# Patient Record
Sex: MALE | Race: OTHER | URBAN - METROPOLITAN AREA
[De-identification: names, ages, dates, MRNs, and addresses within clinical notes are randomized per-mention and may not be internally consistent; named-entity substitution may affect disease eponyms.]

---

## 2022-10-27 ENCOUNTER — OFFICE VISIT (OUTPATIENT)
Dept: FAMILY MEDICINE CLINIC | Facility: CLINIC | Age: 37
End: 2022-10-27
Payer: COMMERCIAL

## 2022-10-27 VITALS
WEIGHT: 204 LBS | TEMPERATURE: 98 F | OXYGEN SATURATION: 96 % | DIASTOLIC BLOOD PRESSURE: 78 MMHG | HEIGHT: 73.66 IN | BODY MASS INDEX: 26.46 KG/M2 | HEART RATE: 85 BPM | SYSTOLIC BLOOD PRESSURE: 124 MMHG | RESPIRATION RATE: 18 BRPM

## 2022-10-27 DIAGNOSIS — Z13.21 SCREENING FOR ENDOCRINE, NUTRITIONAL, METABOLIC AND IMMUNITY DISORDER: ICD-10-CM

## 2022-10-27 DIAGNOSIS — Z80.42 FAMILY HISTORY OF PROSTATE CANCER IN FATHER: ICD-10-CM

## 2022-10-27 DIAGNOSIS — Z00.00 ANNUAL PHYSICAL EXAM: Primary | ICD-10-CM

## 2022-10-27 DIAGNOSIS — Z13.0 SCREENING FOR ENDOCRINE, NUTRITIONAL, METABOLIC AND IMMUNITY DISORDER: ICD-10-CM

## 2022-10-27 DIAGNOSIS — Z13.228 SCREENING FOR ENDOCRINE, NUTRITIONAL, METABOLIC AND IMMUNITY DISORDER: ICD-10-CM

## 2022-10-27 DIAGNOSIS — Z13.29 SCREENING FOR ENDOCRINE, NUTRITIONAL, METABOLIC AND IMMUNITY DISORDER: ICD-10-CM

## 2022-10-27 DIAGNOSIS — Z13.6 SCREENING FOR ISCHEMIC HEART DISEASE: ICD-10-CM

## 2022-10-27 LAB
ALBUMIN SERPL-MCNC: 4.3 G/DL (ref 3.4–5)
ALBUMIN/GLOB SERPL: 1.3 {RATIO} (ref 1–2)
ALP LIVER SERPL-CCNC: 50 U/L
ALT SERPL-CCNC: 33 U/L
ANION GAP SERPL CALC-SCNC: 6 MMOL/L (ref 0–18)
AST SERPL-CCNC: 23 U/L (ref 15–37)
BASOPHILS # BLD AUTO: 0.05 X10(3) UL (ref 0–0.2)
BASOPHILS NFR BLD AUTO: 0.9 %
BILIRUB SERPL-MCNC: 0.8 MG/DL (ref 0.1–2)
BUN BLD-MCNC: 12 MG/DL (ref 7–18)
CALCIUM BLD-MCNC: 9.3 MG/DL (ref 8.5–10.1)
CHLORIDE SERPL-SCNC: 106 MMOL/L (ref 98–112)
CHOLEST SERPL-MCNC: 175 MG/DL (ref ?–200)
CO2 SERPL-SCNC: 28 MMOL/L (ref 21–32)
COMPLEXED PSA SERPL-MCNC: 0.36 NG/ML (ref ?–4)
CREAT BLD-MCNC: 1.14 MG/DL
EOSINOPHIL # BLD AUTO: 0.2 X10(3) UL (ref 0–0.7)
EOSINOPHIL NFR BLD AUTO: 3.6 %
ERYTHROCYTE [DISTWIDTH] IN BLOOD BY AUTOMATED COUNT: 12 %
FASTING PATIENT LIPID ANSWER: NO
FASTING STATUS PATIENT QL REPORTED: NO
GFR SERPLBLD BASED ON 1.73 SQ M-ARVRAT: 86 ML/MIN/1.73M2 (ref 60–?)
GLOBULIN PLAS-MCNC: 3.3 G/DL (ref 2.8–4.4)
GLUCOSE BLD-MCNC: 88 MG/DL (ref 70–99)
HCT VFR BLD AUTO: 45.7 %
HDLC SERPL-MCNC: 43 MG/DL (ref 40–59)
HGB BLD-MCNC: 15.9 G/DL
IMM GRANULOCYTES # BLD AUTO: 0.01 X10(3) UL (ref 0–1)
IMM GRANULOCYTES NFR BLD: 0.2 %
LDLC SERPL CALC-MCNC: 107 MG/DL (ref ?–100)
LYMPHOCYTES # BLD AUTO: 1.76 X10(3) UL (ref 1–4)
LYMPHOCYTES NFR BLD AUTO: 31.3 %
MCH RBC QN AUTO: 31.7 PG (ref 26–34)
MCHC RBC AUTO-ENTMCNC: 34.8 G/DL (ref 31–37)
MCV RBC AUTO: 91.2 FL
MONOCYTES # BLD AUTO: 0.52 X10(3) UL (ref 0.1–1)
MONOCYTES NFR BLD AUTO: 9.3 %
NEUTROPHILS # BLD AUTO: 3.08 X10 (3) UL (ref 1.5–7.7)
NEUTROPHILS # BLD AUTO: 3.08 X10(3) UL (ref 1.5–7.7)
NEUTROPHILS NFR BLD AUTO: 54.7 %
NONHDLC SERPL-MCNC: 132 MG/DL (ref ?–130)
OSMOLALITY SERPL CALC.SUM OF ELEC: 289 MOSM/KG (ref 275–295)
PLATELET # BLD AUTO: 262 10(3)UL (ref 150–450)
POTASSIUM SERPL-SCNC: 4.6 MMOL/L (ref 3.5–5.1)
PROT SERPL-MCNC: 7.6 G/DL (ref 6.4–8.2)
RBC # BLD AUTO: 5.01 X10(6)UL
SODIUM SERPL-SCNC: 140 MMOL/L (ref 136–145)
TRIGL SERPL-MCNC: 141 MG/DL (ref 30–149)
TSI SER-ACNC: 2.41 MIU/ML (ref 0.36–3.74)
VLDLC SERPL CALC-MCNC: 24 MG/DL (ref 0–30)
WBC # BLD AUTO: 5.6 X10(3) UL (ref 4–11)

## 2022-10-27 PROCEDURE — 3074F SYST BP LT 130 MM HG: CPT | Performed by: FAMILY MEDICINE

## 2022-10-27 PROCEDURE — 80061 LIPID PANEL: CPT | Performed by: FAMILY MEDICINE

## 2022-10-27 PROCEDURE — 36415 COLL VENOUS BLD VENIPUNCTURE: CPT | Performed by: FAMILY MEDICINE

## 2022-10-27 PROCEDURE — 3078F DIAST BP <80 MM HG: CPT | Performed by: FAMILY MEDICINE

## 2022-10-27 PROCEDURE — 84443 ASSAY THYROID STIM HORMONE: CPT | Performed by: FAMILY MEDICINE

## 2022-10-27 PROCEDURE — 99385 PREV VISIT NEW AGE 18-39: CPT | Performed by: FAMILY MEDICINE

## 2022-10-27 PROCEDURE — 3008F BODY MASS INDEX DOCD: CPT | Performed by: FAMILY MEDICINE

## 2022-10-27 PROCEDURE — 85025 COMPLETE CBC W/AUTO DIFF WBC: CPT | Performed by: FAMILY MEDICINE

## 2022-10-27 PROCEDURE — 80053 COMPREHEN METABOLIC PANEL: CPT | Performed by: FAMILY MEDICINE

## 2022-10-27 NOTE — PATIENT INSTRUCTIONS
Perform labs fasting 8 hours with water or black coffee or or black tea diet  soda only prior to exam.    -Encourage healthy diet of whole food and avoid processed food and sugary drinks and sodas. Diet should include lean meats and vegetables including 5-7 servings of fruit and vegetables total in 1 day. Never skip breakfast.  -Encouraged exercise 30 minutes or more 5 times weekly to prevent obesity and chronic disease and eliminate stress and its effect on the body. Total 150mins weekly or more. -encouraged to continue not smoking  - recommend condom use per CDC recommendation for all  or unmarried couples  -  immunizations- annual influenza vaccine recommended  -Vitamin D3 1000- 2000 units daily recommended  -Needs 1000mg of calcium daily for osteoporosis prevention discussed. Need to ingest 1000mg of calcium daily to prevent osteoporosis later in life. I.e. one 8 ounce glass of silk Mentone milk has 450 mg of calcium and label states 45%. Labels list calcium percentages not milligrams. To calculate milligrams per serving remove the percentage and add a zero (0).  I.e.  9% calcium equals 90 mg

## 2022-10-27 NOTE — PROGRESS NOTES
Patient came in for draw of ordered fasting labs. Patient drawn out of right arm AC, x 1 attempt and tolerated well.  1 SST (Green ) 1 Lavender  tube drawn.

## 2022-10-31 NOTE — PROGRESS NOTES
Becky notified:  See my chart message to patient. Dear Jia Walters,  Your labs are normal except your lipid panel is elevated. Treatment for mild to moderate abnormal cholesterol and lipids is best managed  initially with lifestyle changes, improving diet and increasing physical activity. Recommendations for exercise are 3-5 times weekly for 30-60 minutes for a minimum of 150-300 minutes. This will improve your cholesterol levels and strengthen your heart. If you are overweight, then losing weight may also improve your lipid profile. The Mediterranean diet is recommended and contains foods high in omega-3's and alpha linoleic acid; this helps improve your good cholesterol and may lower bad cholesterol. Eat salmon 2x weekly, consume moderate vegetables,lean meats/fish, nuts and healthy fats i.e. almonds, walnuts, flaxseed, hempseed, avocados, avocado or olive oil, spinach, green beans, organic strawberries, etc. Please avoid fried food or limit to 1 x monthly along with pizza and other foods that are high in saturated animal fats. Monitoring your cholesterol and lipid profile is recommended annually sooner as as directed by your doctor. Please call our office  if you have any further question or schedule an appointment.     Sincerely,  Tamar Reyes

## 2022-11-01 ENCOUNTER — TELEPHONE (OUTPATIENT)
Dept: FAMILY MEDICINE CLINIC | Facility: CLINIC | Age: 37
End: 2022-11-01

## 2022-11-01 NOTE — TELEPHONE ENCOUNTER
----- Message from Steve Terrazas DO sent at 10/31/2022 12:12 AM CDT -----  Becky notified:  See my chart message to patient. Dear Sandrita Groves,  Your labs are normal except your lipid panel is elevated. Treatment for mild to moderate abnormal cholesterol and lipids is best managed  initially with lifestyle changes, improving diet and increasing physical activity. Recommendations for exercise are 3-5 times weekly for 30-60 minutes for a minimum of 150-300 minutes. This will improve your cholesterol levels and strengthen your heart. If you are overweight, then losing weight may also improve your lipid profile. The Mediterranean diet is recommended and contains foods high in omega-3's and alpha linoleic acid; this helps improve your good cholesterol and may lower bad cholesterol. Eat salmon 2x weekly, consume moderate vegetables,lean meats/fish, nuts and healthy fats i.e. almonds, walnuts, flaxseed, hempseed, avocados, avocado or olive oil, spinach, green beans, organic strawberries, etc. Please avoid fried food or limit to 1 x monthly along with pizza and other foods that are high in saturated animal fats. Monitoring your cholesterol and lipid profile is recommended annually sooner as as directed by your doctor. Please call our office  if you have any further question or schedule an appointment.     Sincerely,  Izabella Barbour

## 2022-11-01 NOTE — TELEPHONE ENCOUNTER
Left detailed message informing patient test results and recommendations are available to him via [employer]. Advised patient to review those as soon as possible and contact our office with questions.

## 2023-01-13 ENCOUNTER — PATIENT MESSAGE (OUTPATIENT)
Dept: FAMILY MEDICINE CLINIC | Facility: CLINIC | Age: 38
End: 2023-01-13

## 2023-01-13 NOTE — TELEPHONE ENCOUNTER
From: Wang Heck Board  To: Fifi Smith DO  Sent: 1/13/2023 9:40 AM CST  Subject: TDAP vs. TD vaccines? Good morning,    I am curious if I should be getting a TDAP vaccine in anticipation of my daughter being born (due in March). I received a tetanus shot in summer 2020, but I don't know if it was the full TDAP or if it was just TD. I don't feel that I need an additional shot, if I'm already covered, but I want to make sure I've had what I need. Please advise. Thanks!

## 2023-11-03 ENCOUNTER — OFFICE VISIT (OUTPATIENT)
Dept: FAMILY MEDICINE CLINIC | Facility: CLINIC | Age: 38
End: 2023-11-03
Payer: COMMERCIAL

## 2023-11-03 VITALS
OXYGEN SATURATION: 99 % | HEART RATE: 75 BPM | BODY MASS INDEX: 27 KG/M2 | DIASTOLIC BLOOD PRESSURE: 60 MMHG | TEMPERATURE: 98 F | SYSTOLIC BLOOD PRESSURE: 104 MMHG | WEIGHT: 211.38 LBS | RESPIRATION RATE: 16 BRPM

## 2023-11-03 DIAGNOSIS — Z13.228 SCREENING FOR ENDOCRINE, NUTRITIONAL, METABOLIC AND IMMUNITY DISORDER: ICD-10-CM

## 2023-11-03 DIAGNOSIS — Z13.0 SCREENING FOR ENDOCRINE, NUTRITIONAL, METABOLIC AND IMMUNITY DISORDER: ICD-10-CM

## 2023-11-03 DIAGNOSIS — Z13.29 SCREENING FOR ENDOCRINE, NUTRITIONAL, METABOLIC AND IMMUNITY DISORDER: ICD-10-CM

## 2023-11-03 DIAGNOSIS — Z13.0 SCREENING FOR IRON DEFICIENCY ANEMIA: ICD-10-CM

## 2023-11-03 DIAGNOSIS — Z13.21 SCREENING FOR ENDOCRINE, NUTRITIONAL, METABOLIC AND IMMUNITY DISORDER: ICD-10-CM

## 2023-11-03 DIAGNOSIS — Z80.42 FAMILY HISTORY OF PROSTATE CANCER IN FATHER: ICD-10-CM

## 2023-11-03 DIAGNOSIS — Z13.6 SCREENING FOR HEART DISEASE: ICD-10-CM

## 2023-11-03 DIAGNOSIS — Z00.00 ANNUAL PHYSICAL EXAM: Primary | ICD-10-CM

## 2023-11-03 PROCEDURE — 99395 PREV VISIT EST AGE 18-39: CPT | Performed by: FAMILY MEDICINE

## 2023-11-03 PROCEDURE — 3074F SYST BP LT 130 MM HG: CPT | Performed by: FAMILY MEDICINE

## 2023-11-03 PROCEDURE — 3078F DIAST BP <80 MM HG: CPT | Performed by: FAMILY MEDICINE

## 2023-11-29 ENCOUNTER — NURSE ONLY (OUTPATIENT)
Dept: FAMILY MEDICINE CLINIC | Facility: CLINIC | Age: 38
End: 2023-11-29
Payer: COMMERCIAL

## 2023-11-29 DIAGNOSIS — Z13.21 SCREENING FOR ENDOCRINE, NUTRITIONAL, METABOLIC AND IMMUNITY DISORDER: ICD-10-CM

## 2023-11-29 DIAGNOSIS — R73.01 ELEVATED FASTING GLUCOSE: ICD-10-CM

## 2023-11-29 DIAGNOSIS — Z13.0 SCREENING FOR IRON DEFICIENCY ANEMIA: ICD-10-CM

## 2023-11-29 DIAGNOSIS — Z13.6 SCREENING FOR HEART DISEASE: ICD-10-CM

## 2023-11-29 DIAGNOSIS — Z00.00 ANNUAL PHYSICAL EXAM: ICD-10-CM

## 2023-11-29 DIAGNOSIS — Z13.0 SCREENING FOR ENDOCRINE, NUTRITIONAL, METABOLIC AND IMMUNITY DISORDER: ICD-10-CM

## 2023-11-29 DIAGNOSIS — Z13.228 SCREENING FOR ENDOCRINE, NUTRITIONAL, METABOLIC AND IMMUNITY DISORDER: ICD-10-CM

## 2023-11-29 DIAGNOSIS — Z13.29 SCREENING FOR ENDOCRINE, NUTRITIONAL, METABOLIC AND IMMUNITY DISORDER: ICD-10-CM

## 2023-11-29 DIAGNOSIS — R73.01 ELEVATED FASTING GLUCOSE: Primary | ICD-10-CM

## 2023-11-29 DIAGNOSIS — Z80.42 FAMILY HISTORY OF PROSTATE CANCER IN FATHER: ICD-10-CM

## 2023-11-29 LAB
ALBUMIN SERPL-MCNC: 4.3 G/DL (ref 3.4–5)
ALBUMIN/GLOB SERPL: 1.3 {RATIO} (ref 1–2)
ALP LIVER SERPL-CCNC: 51 U/L
ALT SERPL-CCNC: 40 U/L
ANION GAP SERPL CALC-SCNC: 5 MMOL/L (ref 0–18)
AST SERPL-CCNC: 23 U/L (ref 15–37)
BASOPHILS # BLD AUTO: 0.05 X10(3) UL (ref 0–0.2)
BASOPHILS NFR BLD AUTO: 1 %
BILIRUB SERPL-MCNC: 1.2 MG/DL (ref 0.1–2)
BUN BLD-MCNC: 14 MG/DL (ref 9–23)
CALCIUM BLD-MCNC: 9.5 MG/DL (ref 8.5–10.1)
CHLORIDE SERPL-SCNC: 106 MMOL/L (ref 98–112)
CHOLEST SERPL-MCNC: 203 MG/DL (ref ?–200)
CO2 SERPL-SCNC: 30 MMOL/L (ref 21–32)
COMPLEXED PSA SERPL-MCNC: 0.34 NG/ML (ref ?–4)
CREAT BLD-MCNC: 1.14 MG/DL
EGFRCR SERPLBLD CKD-EPI 2021: 85 ML/MIN/1.73M2 (ref 60–?)
EOSINOPHIL # BLD AUTO: 0.18 X10(3) UL (ref 0–0.7)
EOSINOPHIL NFR BLD AUTO: 3.5 %
ERYTHROCYTE [DISTWIDTH] IN BLOOD BY AUTOMATED COUNT: 11.9 %
EST. AVERAGE GLUCOSE BLD GHB EST-MCNC: 108 MG/DL (ref 68–126)
FASTING PATIENT LIPID ANSWER: YES
FASTING STATUS PATIENT QL REPORTED: YES
GLOBULIN PLAS-MCNC: 3.3 G/DL (ref 2.8–4.4)
GLUCOSE BLD-MCNC: 102 MG/DL (ref 70–99)
HBA1C MFR BLD: 5.4 % (ref ?–5.7)
HCT VFR BLD AUTO: 44.3 %
HDLC SERPL-MCNC: 42 MG/DL (ref 40–59)
HGB BLD-MCNC: 15.4 G/DL
IMM GRANULOCYTES # BLD AUTO: 0.01 X10(3) UL (ref 0–1)
IMM GRANULOCYTES NFR BLD: 0.2 %
LDLC SERPL CALC-MCNC: 131 MG/DL (ref ?–100)
LYMPHOCYTES # BLD AUTO: 1.83 X10(3) UL (ref 1–4)
LYMPHOCYTES NFR BLD AUTO: 35.5 %
MCH RBC QN AUTO: 31.4 PG (ref 26–34)
MCHC RBC AUTO-ENTMCNC: 34.8 G/DL (ref 31–37)
MCV RBC AUTO: 90.2 FL
MONOCYTES # BLD AUTO: 0.46 X10(3) UL (ref 0.1–1)
MONOCYTES NFR BLD AUTO: 8.9 %
NEUTROPHILS # BLD AUTO: 2.63 X10 (3) UL (ref 1.5–7.7)
NEUTROPHILS # BLD AUTO: 2.63 X10(3) UL (ref 1.5–7.7)
NEUTROPHILS NFR BLD AUTO: 50.9 %
NONHDLC SERPL-MCNC: 161 MG/DL (ref ?–130)
OSMOLALITY SERPL CALC.SUM OF ELEC: 293 MOSM/KG (ref 275–295)
PLATELET # BLD AUTO: 256 10(3)UL (ref 150–450)
POTASSIUM SERPL-SCNC: 4.3 MMOL/L (ref 3.5–5.1)
PROT SERPL-MCNC: 7.6 G/DL (ref 6.4–8.2)
RBC # BLD AUTO: 4.91 X10(6)UL
SODIUM SERPL-SCNC: 141 MMOL/L (ref 136–145)
TRIGL SERPL-MCNC: 170 MG/DL (ref 30–149)
TSI SER-ACNC: 1.83 MIU/ML (ref 0.36–3.74)
VLDLC SERPL CALC-MCNC: 31 MG/DL (ref 0–30)
WBC # BLD AUTO: 5.2 X10(3) UL (ref 4–11)

## 2023-11-29 PROCEDURE — 83036 HEMOGLOBIN GLYCOSYLATED A1C: CPT | Performed by: FAMILY MEDICINE

## 2023-11-29 PROCEDURE — 84443 ASSAY THYROID STIM HORMONE: CPT | Performed by: FAMILY MEDICINE

## 2023-11-29 PROCEDURE — 36415 COLL VENOUS BLD VENIPUNCTURE: CPT | Performed by: FAMILY MEDICINE

## 2023-11-29 PROCEDURE — 80053 COMPREHEN METABOLIC PANEL: CPT | Performed by: FAMILY MEDICINE

## 2023-11-29 PROCEDURE — 85025 COMPLETE CBC W/AUTO DIFF WBC: CPT | Performed by: FAMILY MEDICINE

## 2023-11-29 PROCEDURE — 80061 LIPID PANEL: CPT | Performed by: FAMILY MEDICINE

## 2023-12-16 ENCOUNTER — TELEMEDICINE (OUTPATIENT)
Dept: TELEHEALTH | Age: 38
End: 2023-12-16
Payer: COMMERCIAL

## 2023-12-16 ENCOUNTER — E-VISIT (OUTPATIENT)
Dept: TELEHEALTH | Age: 38
End: 2023-12-16
Payer: COMMERCIAL

## 2023-12-16 DIAGNOSIS — Z02.9 ADMINISTRATIVE ENCOUNTER: Primary | ICD-10-CM

## 2023-12-16 DIAGNOSIS — J20.9 ACUTE BRONCHITIS, UNSPECIFIED ORGANISM: Primary | ICD-10-CM

## 2023-12-16 PROCEDURE — 99213 OFFICE O/P EST LOW 20 MIN: CPT | Performed by: NURSE PRACTITIONER

## 2023-12-16 RX ORDER — BENZONATATE 200 MG/1
200 CAPSULE ORAL 3 TIMES DAILY PRN
Qty: 20 CAPSULE | Refills: 0 | Status: SHIPPED | OUTPATIENT
Start: 2023-12-16 | End: 2023-12-23

## 2023-12-16 RX ORDER — ALBUTEROL SULFATE 90 UG/1
2 AEROSOL, METERED RESPIRATORY (INHALATION)
Qty: 1 EACH | Refills: 0 | Status: SHIPPED | OUTPATIENT
Start: 2023-12-16

## 2023-12-16 NOTE — PROGRESS NOTES
Virtual/Telephone Check-In    Jose Luis Delarosa verbally consents to a [employer] on 12/16/23. Patient has been referred to the [facility] website at [email or web address] to review the yearly Consent to Treat document. Patient understands and accepts financial responsibility for any deductible, co-insurance and/or co-pays associated with this service. Telehealth Verbal Consent   I conducted a telehealth visit with [address] today, 12/16/23, which was completed using two-way, real-time interactive audio and video communication. This has been done in good mega to provide continuity of care in the best interest of the provider-patient relationship, due to the COVID - public health crisis/national emergency where restrictions of face-to-face office visits are ongoing. Every conscious effort was taken to allow for sufficient and adequate time to complete the visit. The patient was made aware of the limitations of the telehealth visit, including treatment limitations as no physical exam could be performed. The patient was advised to call 911 or to go to the ER in case there was an emergency. The patient was also advised of the potential privacy & security concerns related to the telehealth platform. The patient was made aware of where to find [facility] notice of privacy practices, telehealth consent form and other related consent forms and documents. which are located on the [facility] website. The patient verbally agreed to telehealth consent form, related consents and the risks discussed. Lastly, the patient confirmed that they were in [place]. Included in this visit, time may have been spent reviewing labs, medications, radiology tests and decision making. Appropriate medical decision-making and tests are ordered as detailed in the plan of care above. Coding/billing information is submitted for this visit based on complexity of care and/or time spent for the visit.     CHIEF COMPLAINT: Chief Complaint   Patient presents with    Cough       HPI:   Ltei Quiros is a 41year old male who presents for a video visit. Patient reports cough. On 12/9/23 had congestion, a lot of nasal drainage, HA  Sx all improved 12/11, then tues lost voice but has been improving. Then develped cough. Chest feels inflamed/irritated from coughing. Triggers for cough - talking a lot  Had bronchitis as a child- used inhaler    Sx onset: 12/9/23  Treating sx with dayquil, nyquil, mucinex DM, tylenol, ibu  No known COVID or flu exposure. Reports previous history of COVID  Denies recent travel  COVID vaccination: original and 2 boosters  Flu vaccination this season: yes  COVID test taken since sx onset: negative home test today   Has dtr in     Associated symptoms:    Yes   No  []    [x] Fever  [x]    [] Cough:             Dry [x] later in the day;    Productive [x]  In AM  [x]    [] Congestion ; mild intermittent congestion  []    [x] Loss of Smell/Taste:    []    [x] Sore throat     []    [x] Ear Pain     []    [x] Fatigue   [x]    [] Myalgias - mild  []    [x] Chills           [x]    [] Headache    []    [x] Shortness of breath/Trouble Breathing  []    [x] Wheezing  []    [x] Chest pain/pressure    []    [x] GI symptoms                 []  Nausea;   [] Vomiting;   [] Diarrhea;   [] Upset stomach;    []Abdominal Pain         Current Outpatient Medications   Medication Sig Dispense Refill    finasteride 1 mg/mL Oral Solution Take 5 mL (5 mg total) by mouth daily. Minoxidil 5 % External Foam Apply topically. Pt applies on scalp        History reviewed. No pertinent past medical history. History reviewed. No pertinent surgical history.       Social History     Socioeconomic History    Marital status:    Tobacco Use    Smoking status: Never    Smokeless tobacco: Never   Vaping Use    Vaping Use: Never used   Substance and Sexual Activity    Drug use: Never         REVIEW OF SYSTEMS: GENERAL: fair appetite  SKIN: no rashes or abnormal skin lesions  HEENT: See HPI  LUNGS:  See HPI  CARDIOVASCULAR: see HPI  GI: see HPI  NEURO: See HPI    EXAM:   General: Alert, Well-appearing, and In no acute distress  Respiratory:   Speaking in full sentences comfortably  Normal work of breathing  Coughing during visit - dry bronchial cough  Head: Normocephalic  Eyes: Conjunctiva clear  Nose: No obvious nasal discharge. Skin: No obvious rashes or lesions from what observed. Mood: Affect appropriate    ASSESSMENT AND PLAN:   Louis Jones is a 41year old male who presents with symptoms that are consistent with    ASSESSMENT:   Encounter Diagnosis   Name Primary? Acute bronchitis, unspecified organism Yes       PLAN:   Home COVID neg. Discussed likely viral etiology  Will treat with medication as below  Discussed use, dose, and possible side effects  Comfort measures as per pt instructions  To f/u with PCP if no improvement in 3-5 days or sooner for new or worsening symptoms  Discussed s/s of worsening infection/condition and importance of prompt medical re-evaluation including when to seek emergency care. See pt instructions    Meds & Refills for this Visit:  Requested Prescriptions     Signed Prescriptions Disp Refills    benzonatate 200 MG Oral Cap 20 capsule 0     Sig: Take 1 capsule (200 mg total) by mouth 3 (three) times daily as needed for cough. Swallow whole. Do not open or dissolve capsule. albuterol 108 (90 Base) MCG/ACT Inhalation Aero Soln 1 each 0     Sig: Inhale 2 puffs into the lungs every 4 to 6 hours as needed for Wheezing or Shortness of Breath.            Patient Instructions   Use albuterol inhaler as prescribed  Take benzonatate as prescribed for cough  Take mucinex as per package directions  Follow up for in-person visit if no improvement in 3-5 days or sooner for new/worsening symptoms     Verbalized understanding of instructions        Face to face time spent on Video Visit: 12:26 min  Total Time spent on visit including reviewing history, ordering labs/medication, patient examination and education: 15 min

## 2023-12-17 NOTE — PATIENT INSTRUCTIONS
Use albuterol inhaler as prescribed  Take benzonatate as prescribed for cough  Take mucinex as per package directions  Follow up for in-person visit if no improvement in 3-5 days or sooner for new/worsening symptoms

## 2023-12-23 ENCOUNTER — OFFICE VISIT (OUTPATIENT)
Dept: FAMILY MEDICINE CLINIC | Facility: CLINIC | Age: 38
End: 2023-12-23
Payer: COMMERCIAL

## 2023-12-23 VITALS
SYSTOLIC BLOOD PRESSURE: 136 MMHG | RESPIRATION RATE: 18 BRPM | HEART RATE: 97 BPM | DIASTOLIC BLOOD PRESSURE: 80 MMHG | OXYGEN SATURATION: 96 % | TEMPERATURE: 99 F

## 2023-12-23 DIAGNOSIS — J01.00 ACUTE NON-RECURRENT MAXILLARY SINUSITIS: Primary | ICD-10-CM

## 2023-12-23 DIAGNOSIS — R68.89 FLU-LIKE SYMPTOMS: ICD-10-CM

## 2023-12-23 PROCEDURE — 87637 SARSCOV2&INF A&B&RSV AMP PRB: CPT | Performed by: FAMILY MEDICINE

## 2023-12-23 RX ORDER — AMOXICILLIN AND CLAVULANATE POTASSIUM 875; 125 MG/1; MG/1
1 TABLET, FILM COATED ORAL 2 TIMES DAILY
Qty: 20 TABLET | Refills: 0 | Status: SHIPPED | OUTPATIENT
Start: 2023-12-23 | End: 2024-01-02

## 2023-12-23 NOTE — PATIENT INSTRUCTIONS
Take antibiotics with food and plenty of water. Eat yogurt or take probiotic daily. (Tony Teixeira is a good example of an OTC probiotic)  Make sure to finish the entire antibiotic treatment. Increase fluids and rest.   Use otc meds as needed. Monitor symptoms and contact the office if no better in 2-3 days.

## 2023-12-24 LAB
FLUAV + FLUBV RNA SPEC NAA+PROBE: NEGATIVE
FLUAV + FLUBV RNA SPEC NAA+PROBE: NEGATIVE
RSV RNA SPEC NAA+PROBE: POSITIVE
SARS-COV-2 RNA RESP QL NAA+PROBE: NOT DETECTED

## 2024-02-02 ENCOUNTER — TELEMEDICINE (OUTPATIENT)
Dept: FAMILY MEDICINE CLINIC | Facility: CLINIC | Age: 39
End: 2024-02-02
Payer: COMMERCIAL

## 2024-02-02 DIAGNOSIS — H10.021 PINK EYE DISEASE OF RIGHT EYE: Primary | ICD-10-CM

## 2024-02-02 RX ORDER — MOXIFLOXACIN 5 MG/ML
1 SOLUTION/ DROPS OPHTHALMIC 3 TIMES DAILY
Qty: 3 ML | Refills: 0 | Status: SHIPPED | OUTPATIENT
Start: 2024-02-02

## 2024-02-02 NOTE — PROGRESS NOTES
Due to COVID-19 ACTION PLAN, the patient's office visit was converted to a video visit.  Time Spent: 5 min    Chief Complaint   Patient presents with    Pink Eye     X1 day      Subjective     HPI:   Orion Corrigan is a 38 year old male who presents for right mild eye redness and purulent discharge x 1 day.  Patient woke up this morning with his right eye glued shut.  States daughter had pinkeye this past week.  This a.m. had difficulty seeing due to the eye discharge but vision is normal now.  Does not wear contact lenses.  Denies any fever or chills, runny nose, headache.  States left eye is still clear.     Chief Complaint Reviewed and Verified  Nursing Notes Reviewed and   Verified  Tobacco Reviewed  Allergies Reviewed  Medical History   Reviewed  Surgical History Reviewed  Family History Reviewed  Social   History Reviewed                Current Outpatient Medications on File Prior to Visit   Medication Sig Dispense Refill    albuterol 108 (90 Base) MCG/ACT Inhalation Aero Soln Inhale 2 puffs into the lungs every 4 to 6 hours as needed for Wheezing or Shortness of Breath. 1 each 0    finasteride 1 mg/mL Oral Solution Take 5 mL (5 mg total) by mouth daily.      Minoxidil 5 % External Foam Apply topically. Pt applies on scalp       No current facility-administered medications on file prior to visit.           Physical Exam:    alert and cooperative, well-nourished/well-hydrated, no no pallor or tachypnea, appropriately groomed, speaking in full sentences comfortably and Normal work of breathing, answers questions appropriately        Assessment    Diagnoses and all orders for this visit:    Pink eye disease of right eye  -     moxifloxacin 0.5 % Ophthalmic Solution; Place 1 drop into both eyes 3 (three) times daily.  Patient instructed:  Apply warm compresses twice daily.   Use Michael's baby shampoo half a teaspoon diluted in a cup and a half of warm water to clean the eye in the morning with exudate    Frequent hand washing.    No contacts or eye make up for next 7 days.   Throw out any recently used eye make-up  You are contagious until eye symptoms resolve.  Usually takes 24-48 hours on antibiotics.  PLEASE finish the prescribed antibiotic or the infection will be ineffectively treated and may return.  If break out in a rash or symptoms not improving in 24 to 48 hours then call office immediately.  Risk of shingles-no current rashes  If eye swells shut or lose vision or fever develops, go to ER immediately and call your ophthamologist.     Prescription medication ordered.    Return in about 3 days (around 2/5/2024), or if symptoms worsen or fail to improve.    Pia Saha,       Orion Shekhar understands video or phone evaluation is not a substitute for face-to-face examination or emergency care. Patient advised to go to ER or call 911 for worsening symptoms or acute distress.     Patient/Caregiver Education: Patient/Caregiver Education: There are no barriers to learning. Medical education done.   Outcome: Patient verbalizes understanding. Patient is notified to call with any questions, complications, allergies, or worsening or changing symptoms.  Patient is to call with any side effects or complications from the treatments as a result of today.     Please note that the following visit was completed using two-way, real-time interactive audio and/or video communication.  This has been done in good mega to provide continuity of care in the best interest of the provider-patient relationship, due to the on-going public health crisis/national emergency and because of restrictions of visitation.  There are limitations of this visit as no physical exam could be performed.  Every conscious effort was taken to allow for sufficient and adequate time.  This billing visit was spent on reviewing labs, medications, radiology tests and decision making.  Appropriate medical decision-making and tests are ordered as detailed  in the plan of care above.

## 2024-03-28 ENCOUNTER — PATIENT MESSAGE (OUTPATIENT)
Dept: FAMILY MEDICINE CLINIC | Facility: CLINIC | Age: 39
End: 2024-03-28

## 2024-03-28 DIAGNOSIS — H10.021 PINK EYE DISEASE OF RIGHT EYE: ICD-10-CM

## 2024-03-28 NOTE — TELEPHONE ENCOUNTER
Refill no protocol available:     Pt requesting refill of   Requested Prescriptions     Pending Prescriptions Disp Refills    moxifloxacin 0.5 % Ophthalmic Solution 3 mL 0     Sig: Place 1 drop into both eyes 3 (three) times daily.        Sent to Provider for review:  Pink eye disease of right eye  -     moxifloxacin 0.5 % Ophthalmic Solution; Place 1 drop into both eyes 3 (three) times daily.  Patient instructed:  Apply warm compresses twice daily.   Use Michael's baby shampoo half a teaspoon diluted in a cup and a half of warm water to clean the eye in the morning with exudate   Frequent hand washing.    No contacts or eye make up for next 7 days.   Throw out any recently used eye make-up  You are contagious until eye symptoms resolve.  Usually takes 24-48 hours on antibiotics.  PLEASE finish the prescribed antibiotic or the infection will be ineffectively treated and may return.  If break out in a rash or symptoms not improving in 24 to 48 hours then call office immediately.  Risk of shingles-no current rashes  If eye swells shut or lose vision or fever develops, go to ER immediately and call your ophthamologist.    Last Time Medication was Filled:  2/2/2024    Last Office Visit with Provider: 2/2/2024    No future appointments.       Patient comment: Catherine has returned

## 2024-03-28 NOTE — TELEPHONE ENCOUNTER
From: Orion Corrigan  To: Trudi Wilson  Sent: 3/28/2024 6:29 AM CDT  Subject: Pinkeye    I requested a refill for the pinkeye eye drops. Here's a photo of my eye this morning.

## 2024-03-28 NOTE — TELEPHONE ENCOUNTER
No available appointments today would you like to do Evisit on patient ?    He was last evaluated on 2/2/2024    Pink eye disease of right eye  -     moxifloxacin 0.5 % Ophthalmic Solution; Place 1 drop into both eyes 3 (three) times daily.  Patient instructed:  Apply warm compresses twice daily.   Use Michael's baby shampoo half a teaspoon diluted in a cup and a half of warm water to clean the eye in the morning with exudate   Frequent hand washing.    No contacts or eye make up for next 7 days.   Throw out any recently used eye make-up  You are contagious until eye symptoms resolve.  Usually takes 24-48 hours on antibiotics.  PLEASE finish the prescribed antibiotic or the infection will be ineffectively treated and may return.  If break out in a rash or symptoms not improving in 24 to 48 hours then call office immediately.  Risk of shingles-no current rashes  If eye swells shut or lose vision or fever develops, go to ER immediately and call your ophthamologist.

## 2024-03-29 RX ORDER — MOXIFLOXACIN 5 MG/ML
1 SOLUTION/ DROPS OPHTHALMIC 3 TIMES DAILY
Qty: 3 ML | Refills: 0 | Status: SHIPPED | OUTPATIENT
Start: 2024-03-29

## 2024-11-20 ENCOUNTER — OFFICE VISIT (OUTPATIENT)
Dept: FAMILY MEDICINE CLINIC | Facility: CLINIC | Age: 39
End: 2024-11-20
Payer: COMMERCIAL

## 2024-11-20 VITALS
OXYGEN SATURATION: 97 % | WEIGHT: 208.38 LBS | HEART RATE: 70 BPM | SYSTOLIC BLOOD PRESSURE: 122 MMHG | RESPIRATION RATE: 18 BRPM | BODY MASS INDEX: 27.32 KG/M2 | DIASTOLIC BLOOD PRESSURE: 86 MMHG | HEIGHT: 73.35 IN | TEMPERATURE: 98 F

## 2024-11-20 DIAGNOSIS — Z13.228 SCREENING FOR ENDOCRINE, NUTRITIONAL, METABOLIC AND IMMUNITY DISORDER: ICD-10-CM

## 2024-11-20 DIAGNOSIS — Z13.21 SCREENING FOR ENDOCRINE, NUTRITIONAL, METABOLIC AND IMMUNITY DISORDER: ICD-10-CM

## 2024-11-20 DIAGNOSIS — Z80.42 FAMILY HISTORY OF PROSTATE CANCER IN FATHER: ICD-10-CM

## 2024-11-20 DIAGNOSIS — Z13.6 SCREENING FOR ISCHEMIC HEART DISEASE: ICD-10-CM

## 2024-11-20 DIAGNOSIS — Z13.0 SCREENING FOR ENDOCRINE, NUTRITIONAL, METABOLIC AND IMMUNITY DISORDER: ICD-10-CM

## 2024-11-20 DIAGNOSIS — Z00.00 ANNUAL PHYSICAL EXAM: Primary | ICD-10-CM

## 2024-11-20 DIAGNOSIS — Z13.29 SCREENING FOR ENDOCRINE, NUTRITIONAL, METABOLIC AND IMMUNITY DISORDER: ICD-10-CM

## 2024-11-20 DIAGNOSIS — Z23 NEED FOR VACCINATION: ICD-10-CM

## 2024-11-20 DIAGNOSIS — K64.9 HEMORRHOIDS, UNSPECIFIED HEMORRHOID TYPE: ICD-10-CM

## 2024-11-20 LAB
ALBUMIN SERPL-MCNC: 4.8 G/DL (ref 3.2–4.8)
ALBUMIN/GLOB SERPL: 1.8 {RATIO} (ref 1–2)
ALP LIVER SERPL-CCNC: 53 U/L
ALT SERPL-CCNC: 24 U/L
ANION GAP SERPL CALC-SCNC: 5 MMOL/L (ref 0–18)
AST SERPL-CCNC: 25 U/L (ref ?–34)
BASOPHILS # BLD AUTO: 0.05 X10(3) UL (ref 0–0.2)
BASOPHILS NFR BLD AUTO: 0.9 %
BILIRUB SERPL-MCNC: 0.9 MG/DL (ref 0.3–1.2)
BUN BLD-MCNC: 11 MG/DL (ref 9–23)
CALCIUM BLD-MCNC: 9.7 MG/DL (ref 8.7–10.4)
CHLORIDE SERPL-SCNC: 107 MMOL/L (ref 98–112)
CHOLEST SERPL-MCNC: 196 MG/DL (ref ?–200)
CO2 SERPL-SCNC: 28 MMOL/L (ref 21–32)
COMPLEXED PSA SERPL-MCNC: 0.28 NG/ML (ref ?–4)
CREAT BLD-MCNC: 1.08 MG/DL
EGFRCR SERPLBLD CKD-EPI 2021: 90 ML/MIN/1.73M2 (ref 60–?)
EOSINOPHIL # BLD AUTO: 0.1 X10(3) UL (ref 0–0.7)
EOSINOPHIL NFR BLD AUTO: 1.9 %
ERYTHROCYTE [DISTWIDTH] IN BLOOD BY AUTOMATED COUNT: 11.9 %
FASTING PATIENT LIPID ANSWER: YES
FASTING STATUS PATIENT QL REPORTED: YES
GLOBULIN PLAS-MCNC: 2.7 G/DL (ref 2–3.5)
GLUCOSE BLD-MCNC: 92 MG/DL (ref 70–99)
HCT VFR BLD AUTO: 43 %
HDLC SERPL-MCNC: 46 MG/DL (ref 40–59)
HGB BLD-MCNC: 15 G/DL
IMM GRANULOCYTES # BLD AUTO: 0.01 X10(3) UL (ref 0–1)
IMM GRANULOCYTES NFR BLD: 0.2 %
LDLC SERPL CALC-MCNC: 135 MG/DL (ref ?–100)
LYMPHOCYTES # BLD AUTO: 1.32 X10(3) UL (ref 1–4)
LYMPHOCYTES NFR BLD AUTO: 24.5 %
MCH RBC QN AUTO: 31.3 PG (ref 26–34)
MCHC RBC AUTO-ENTMCNC: 34.9 G/DL (ref 31–37)
MCV RBC AUTO: 89.8 FL
MONOCYTES # BLD AUTO: 0.56 X10(3) UL (ref 0.1–1)
MONOCYTES NFR BLD AUTO: 10.4 %
NEUTROPHILS # BLD AUTO: 3.35 X10 (3) UL (ref 1.5–7.7)
NEUTROPHILS # BLD AUTO: 3.35 X10(3) UL (ref 1.5–7.7)
NEUTROPHILS NFR BLD AUTO: 62.1 %
NONHDLC SERPL-MCNC: 150 MG/DL (ref ?–130)
OSMOLALITY SERPL CALC.SUM OF ELEC: 289 MOSM/KG (ref 275–295)
PLATELET # BLD AUTO: 266 10(3)UL (ref 150–450)
POTASSIUM SERPL-SCNC: 4 MMOL/L (ref 3.5–5.1)
PROT SERPL-MCNC: 7.5 G/DL (ref 5.7–8.2)
RBC # BLD AUTO: 4.79 X10(6)UL
SODIUM SERPL-SCNC: 140 MMOL/L (ref 136–145)
TRIGL SERPL-MCNC: 80 MG/DL (ref 30–149)
TSI SER-ACNC: 2.42 UIU/ML (ref 0.55–4.78)
VLDLC SERPL CALC-MCNC: 15 MG/DL (ref 0–30)
WBC # BLD AUTO: 5.4 X10(3) UL (ref 4–11)

## 2024-11-20 PROCEDURE — 90471 IMMUNIZATION ADMIN: CPT | Performed by: FAMILY MEDICINE

## 2024-11-20 PROCEDURE — 80053 COMPREHEN METABOLIC PANEL: CPT | Performed by: FAMILY MEDICINE

## 2024-11-20 PROCEDURE — 84443 ASSAY THYROID STIM HORMONE: CPT | Performed by: FAMILY MEDICINE

## 2024-11-20 PROCEDURE — 36415 COLL VENOUS BLD VENIPUNCTURE: CPT | Performed by: FAMILY MEDICINE

## 2024-11-20 PROCEDURE — 99395 PREV VISIT EST AGE 18-39: CPT | Performed by: FAMILY MEDICINE

## 2024-11-20 PROCEDURE — 3074F SYST BP LT 130 MM HG: CPT | Performed by: FAMILY MEDICINE

## 2024-11-20 PROCEDURE — 80061 LIPID PANEL: CPT | Performed by: FAMILY MEDICINE

## 2024-11-20 PROCEDURE — 90656 IIV3 VACC NO PRSV 0.5 ML IM: CPT | Performed by: FAMILY MEDICINE

## 2024-11-20 PROCEDURE — 3079F DIAST BP 80-89 MM HG: CPT | Performed by: FAMILY MEDICINE

## 2024-11-20 PROCEDURE — 3008F BODY MASS INDEX DOCD: CPT | Performed by: FAMILY MEDICINE

## 2024-11-20 PROCEDURE — 85025 COMPLETE CBC W/AUTO DIFF WBC: CPT | Performed by: FAMILY MEDICINE

## 2024-11-20 NOTE — PROGRESS NOTES
Chief Complaint   Patient presents with    Physical     Annual exam        HPI:   Orion Corrigan is a 39 year old male who presents for a complete physical exam.   Patient is present for complete physical. Feels very well. Up to date with dental visits.No hearing problems. Vaccinations: Flu today    Annual physical:  Overall, pt states he feels well. He and his wife recently found out they are expecting their 2nd child.    Sexually active: monogamous  Last PSA: 2023 (father had Prostate Ca, checking annually)  Last Colon Ca screening: n/a    Exercise: was running, not working out regularly   Diet: regular    Rectal bleeding: pt noticed in the last few weeks he had some blood on the toilet paper after BM and wiping.  No birght red blood in the toilet bowl or mixed in with the stool.  Has been 3 weeks and still feeling sore after BM.  He started taking finer supplement.  He reports when this started he had trouble with constipation and was straining.  He has a bidet that he uses regularly.            Wt Readings from Last 3 Encounters:   11/20/24 208 lb 6.4 oz (94.5 kg)   11/03/23 211 lb 6.4 oz (95.9 kg)   10/27/22 204 lb (92.5 kg)      BP Readings from Last 3 Encounters:   11/20/24 122/86   12/23/23 136/80   11/03/23 104/60       Cholesterol, Total (mg/dL)   Date Value   11/29/2023 203 (H)   10/27/2022 175     HDL Cholesterol (mg/dL)   Date Value   11/29/2023 42   10/27/2022 43     LDL Cholesterol (mg/dL)   Date Value   11/29/2023 131 (H)   10/27/2022 107 (H)     AST (U/L)   Date Value   11/29/2023 23   10/27/2022 23     ALT (U/L)   Date Value   11/29/2023 40   10/27/2022 33      Current Outpatient Medications   Medication Sig Dispense Refill    finasteride 1 mg/mL Oral Solution Take 5 mL (5 mg total) by mouth daily.      Minoxidil 5 % External Foam Apply topically. Pt applies on scalp        No past medical history on file.   No past surgical history on file.   Family History   Problem Relation Age of Onset    Breast  Cancer Mother     Prostate Cancer Father     Heart Disease Father     Diabetes Maternal Grandmother     Prostate Cancer Maternal Grandfather     Heart Disease Paternal Grandmother     Heart Disease Paternal Grandfather       Social History     Socioeconomic History    Marital status:    Tobacco Use    Smoking status: Never    Smokeless tobacco: Never   Vaping Use    Vaping status: Never Used   Substance and Sexual Activity    Drug use: Never     Exercise:  was running, not working out regularly .  Diet:  regular   Allergies:  Allergies[1]     REVIEW OF SYSTEMS:     Review of Systems   Constitutional:  Negative for appetite change, fatigue and unexpected weight change.   Gastrointestinal:  Positive for constipation. Negative for abdominal pain, blood in stool, diarrhea, nausea and vomiting.        Rectal bleed   Genitourinary:  Negative for dysuria.        EXAM:   /86 (BP Location: Left arm, Patient Position: Sitting, Cuff Size: adult)   Pulse 70   Temp 97.7 °F (36.5 °C) (Temporal)   Resp 18   Ht 6' 1.35\" (1.863 m)   Wt 208 lb 6.4 oz (94.5 kg)   SpO2 97%   BMI 27.24 kg/m²   GENERAL: WD/WN in no acute distress.   HEENT: PERRLA and EOMI.  OP moist no lesions. TM normal, canals normal.  NECK: is supple,thyroid- normal.  LUNGS: are clear to auscultation bilaterally, with no wheeze, rhonchi, or rales.  HEART: is RRR.  S1, S2, with no murmurs.    ABDOMEN: is soft, NT/ND with no HSM.  No rebound or guarding, NABS.     EXAM: deferred  RECTAL EXAM: deferred  EXTREMITIES: are symmetric with no cyanosis, clubbing, or edema.    SKIN: is unremarkable without rashes.    NEURO: no focal abnormalities, and reflexes coordination and gait normal and symmetric.   MUSK/SKEL: Normal muscles tones, no joint abnormalities, full ROM of all extremities.    back- normal curves, no scoliosis, normal gait,  No joint or muscle abnormalities.  PSYCH: pt is alert, oriented x 3, normal affect.    ASSESSMENT AND PLAN:   Orion  Board is a 39 year old male who presents for a complete physical exam.     1. Annual physical exam  Routine labs ordered today, await results. Counseled pt on healthy lifestyle changes. Vaccines today: Flu today  .     - CBC With Differential With Platelet; Future  - Comp Metabolic Panel; Future  - Lipid Panel; Future  - TSH W Reflex To Free T4; Future  - CBC With Differential With Platelet  - Comp Metabolic Panel  - Lipid Panel  - TSH W Reflex To Free T4    2. Hemorrhoids, unspecified hemorrhoid type  - appears to be resolving  - declines Rx for Anusol today, he can call back for this later and I will Rx  - hemorrhoid self-care d/w pt    3. Screening for ischemic heart disease    - Lipid Panel; Future  - Lipid Panel    4. Screening for endocrine, nutritional, metabolic and immunity disorder    - CBC With Differential With Platelet; Future  - Comp Metabolic Panel; Future  - TSH W Reflex To Free T4; Future  - CBC With Differential With Platelet  - Comp Metabolic Panel  - TSH W Reflex To Free T4    5. Need for vaccination    - INFLUENZA VACCINE, TRI, PRESERV FREE, 0.5 ML    6. Family history of prostate cancer in father  - checking PSA regularly  - pt requesting referral to Urology for management of father Fhx of Prostate Ca    - PSA Total, Screen; Future  - Urology Referral - In Network  - PSA Total, Screen      Pt's weight is Body mass index is 27.24 kg/m²., recommended low fat diet and aerobic exercise 30 minutes three times weekly.   The patient indicates understanding of these issues and agrees to the plan.    Return in about 1 year (around 11/20/2025) for annual physical.           [1] No Known Allergies

## 2024-11-20 NOTE — PROGRESS NOTES
Patient came in for draw of ordered fasting labs. Patient drawn out of left  AC, x 1 attempt and tolerated well.  Gold light green lavender  tube drawn.

## 2025-01-15 NOTE — H&P
HPI:     Orion Corrigan is a 39 year old male with a PMH of hemorrhoids.  He presents as a consult with:  1. Fam h/o CaP  - dad dx with advanced CaP age 50 and passed away 58. Pat uncles did not have cancer  - MGF 60s    PCP - Katie    Presents to establish care.    He feels well. Appetite and energy are good.    No significant LUTS, perhaps rare weak stream.  Incontinence: none  Penoscrotal: no abnormalities  MATEUS: ~ 20 g prostate, no nodules or tenderness    UA is negative    Good potency.    Prior PSAs:  - 0.28 11/20/24  - 0.34 11/29/23  - 0.36 10/27/22    UTI hx: none  Gross hematuria: none  Tobacco hx: none  Kidney stone hx: none  Fam h/o  malignancy: none other than CaP  Other fam h/o cancers: mom passed away from breast ca in 50s    Discussed options for fam h/o CaP at young age and he wants to continue q 1-2 y PSA checks with PCP. Start annual MATEUS and PSA at age 46. Genetic counseling referral placed.    HISTORY:  History reviewed. No pertinent past medical history.   History reviewed. No pertinent surgical history.   Family History   Problem Relation Age of Onset    Breast Cancer Mother     Prostate Cancer Father     Heart Disease Father     Diabetes Maternal Grandmother     Prostate Cancer Maternal Grandfather     Heart Disease Paternal Grandmother     Heart Disease Paternal Grandfather       Social History:   Social History     Socioeconomic History    Marital status:    Tobacco Use    Smoking status: Never    Smokeless tobacco: Never   Vaping Use    Vaping status: Never Used   Substance and Sexual Activity    Drug use: Never        Medications (Active prior to today's visit):  Current Outpatient Medications   Medication Sig Dispense Refill    finasteride 1 mg/mL Oral Solution Take 5 mL (5 mg total) by mouth daily.      Minoxidil 5 % External Foam Apply topically. Pt applies on scalp         Allergies:  Allergies[1]      ROS:     A comprehensive 10 point review of systems was completed.   Pertinent positives and negatives noted in the the HPI.    PHYSICAL EXAM:     GENERAL APPEARANCE: well, developed, well nourished, in no acute distress  NEUROLOGIC: nonfocal, alert and oriented  HEAD: normocephalic, atraumatic  EYES: sclera non-icteric  EARS: hearing intact  ORAL CAVITY: mucosa moist  NECK/THYROID: no obvious goiter or masses  LUNGS: nonlabored breathing  ABDOMEN: soft, no obvious masses or tenderness  SKIN: no obvious rashes    : as noted above     ASSESSMENT/PLAN:   Diagnoses and all orders for this visit:    Family history of prostate cancer in father  -     URINALYSIS NONAUTO W/O SCOPE  -     Cancel: Genetic Referral - In Network  -     OP REFERRAL TO [facility] GENETIC COUNSELING    Family history of breast cancer in mother  -     Cancel: Genetic Referral - In Network  -     OP REFERRAL TO [facility] GENETIC COUNSELING      - as noted above.    Thanks again for this consult.    Edwar Lopez MD, FACS  Urologist  [facility]  Office: [phone]              [1] No Known Allergies

## 2025-01-24 ENCOUNTER — OFFICE VISIT (OUTPATIENT)
Dept: SURGERY | Facility: CLINIC | Age: 40
End: 2025-01-24
Payer: COMMERCIAL

## 2025-01-24 DIAGNOSIS — Z80.3 FAMILY HISTORY OF BREAST CANCER IN MOTHER: ICD-10-CM

## 2025-01-24 DIAGNOSIS — Z80.42 FAMILY HISTORY OF PROSTATE CANCER IN FATHER: Primary | ICD-10-CM

## 2025-01-24 LAB
APPEARANCE: CLEAR
BILIRUBIN: NEGATIVE
GLUCOSE (URINE DIPSTICK): NEGATIVE MG/DL
KETONES (URINE DIPSTICK): NEGATIVE MG/DL
LEUKOCYTES: NEGATIVE
MULTISTIX LOT#: NORMAL NUMERIC
NITRITE, URINE: NEGATIVE
OCCULT BLOOD: NEGATIVE
PH, URINE: 6.5 (ref 4.5–8)
PROTEIN (URINE DIPSTICK): NEGATIVE MG/DL
SPECIFIC GRAVITY: 1.01 (ref 1–1.03)
URINE-COLOR: YELLOW
UROBILINOGEN,SEMI-QN: 0.2 MG/DL (ref 0–1.9)

## 2025-01-24 PROCEDURE — 99203 OFFICE O/P NEW LOW 30 MIN: CPT | Performed by: UROLOGY

## 2025-01-24 PROCEDURE — 81002 URINALYSIS NONAUTO W/O SCOPE: CPT | Performed by: UROLOGY

## 2025-03-12 ENCOUNTER — APPOINTMENT (OUTPATIENT)
Age: 40
End: 2025-03-12
Attending: UROLOGY
Payer: COMMERCIAL

## 2025-03-12 ENCOUNTER — OFFICE VISIT (OUTPATIENT)
Age: 40
End: 2025-03-12
Attending: UROLOGY
Payer: COMMERCIAL

## 2025-03-12 DIAGNOSIS — Z80.42 FH: MALIGNANT NEOPLASM OF PROSTATE: Primary | ICD-10-CM

## 2025-03-12 DIAGNOSIS — Z80.3 FAMILY HISTORY OF BREAST CANCER IN MOTHER: ICD-10-CM

## 2025-03-12 NOTE — PROGRESS NOTES
Referring Provider:  Edwar Lopez MD    Additional Provider(s):  Trudi Wilson MD    Reason for Referral:  Orion Corrigan was referred for genetic counseling because of a family history of prostate cancer. Mr. Corrigan is a 39 year-old man of mixed  descent who has no personal history of cancer. Mr. Corrigan's 25 PSA was within normal limits (0.28 ng/mL). Mr. Corrigan has not had a colonoscopy.    Social History:  Mr. Corrigan was seen today by himself. Mr. Corrigan lives in [place].     Family History:   A three generation pedigree was obtained.      Mr. Corrigan has a two year-old daughter and is expecting a second daughter in 2025.    Mr. Corrigan is an only child.      Mr. Corrigan's mother was diagnosed with breast cancer around age 53 and  at age 59. Mr. Corrigan's mother had one sister and no brothers. Mr. Corrigan's maternal grandmother  at age 84 and did not have cancer. Mr. Corrigan's maternal grandfather was first diagnosed with prostate cancer in his 60s and  from metastatic prostate cancer at age 84.     Mr. Corrigan's father  at age 58 from metastatic prostate cancer. Mr. Corrigan's father had two brothers and no sisters. Mr. Corrigan's paternal grandmother  in her late 60s or early 70s and did not have cancer. Mr. Corrigan's paternal grandfather  in his 50s and did not have cancer.     Please see the pedigree for additional family history information.     Counseling:   The following information was discussed with Mr. Corrigan.    Genetics of Cancer:  The majority of cancers are sporadic whereas approximately 10-30% of cases of cancer cases are attributed to familial factors such as unidentified low penetrance genes in the family or shared environmental factors.  Approximately 5-10% of cancers are related to a hereditary cancer syndrome.  Signs of a hereditary cancer syndrome include some rare cancers, common cancers occurring at unusually young ages, multiple primary cancers in the some  individual, or the same type of cancer or related cancers (e.g., breast and ovarian, colorectal and endometrial) in three or more individuals in the same lineage.      Risk Assessment:   Mr. Corrigan meets NCCN Guidelines testing criteria for prostate cancer susceptibility genes based on his father's history of metastatic prostate cancer. Approximately 6-12% of men with metastatic prostate cancer, unselected for family history, have inherited mutations in a DNA repair gene.       Genetic Testing (Panel):  The pros, cons, and limitations of genetic testing were discussed including the potential implications of test results on clinical management.     If a pathogenic variant is not identified (negative result), it is still possible that Mr. Corrigan has a pathogenic variant in one of these genes that was not detected by the genetic test, or that the family is dealing with a hereditary cancer syndrome involving a different gene. It is also possible that Mr. Corrigan's relatives have a pathogenic variant in one of these genes that Mr. Corrigan did not inherit. In this scenario, options for cancer screening/management should be determined according to personal and family histories and should be discussed with a physician.      A variant of uncertain significance is a DNA change that may or may not alter the function of the gene; therefore, it is usually not possible to determine if the gene variant is responsible for an individual's increased cancer risk.     If Mr. Corrigan is found to carry a pathogenic variant in a cancer predisposition gene, he is at significantly increased risk for various cancers. The magnitude of these risks, and the cancers for which he is at increased risk would depend on the gene involved. Medical recommendations for individuals with BRCA1/2 pathogenic variants were reviewed as an example. It was also explained that for some of the genes for which testing is available, the associated cancer risks have yet to  be determined and medical management recommendations may not yet be available for individuals with pathogenic variants in these genes. If he were to test positive for a pathogenic variant, his children and siblings would each have a 50% chance of carrying the same variant. At-risk adults (>18) would have the option of pursuing targeted genetic testing to clarify their cancer risks. Genetic test results have implications for the entire biological family. Thus, it is recommended that she share her genetic test results with her biological family members so that they may have their risk assessed.     Genetic Information Non-Discrimination Act:  The legal protections of the Genetic Information Nondiscrimination Act (PRAMOD) for health insurance and employment were discussed.  PRAMOD does not provide protection for life insurance, disability or long-term care insurance.    Summary and Plan:  Mr. Corrigan was referred for genetic counseling because of a family history of prostate cancer. His reported family history is suspicious for a hereditary cancer syndrome. Genetic testing on Mr. Corrigan for prostate cancer susceptibility genes is indicated.      At the conclusion of the counseling session Mr. Corrigan decided to proceed with genetic testing. Written consent was obtained.  I will request [facility] authorization for [employer] [facility] Prostate Cancer panel (88306, 27168, 31027, 90999, 62933, 65052, [id number], 07623). Once authorization is obtained, I will request [facility] mail a saliva test kit to Mr. Corrigan's home. I anticipate that Mr. Corrigan's results will be available within 2-3 weeks from the time that the sample is received by the laboratory and will call him with the results.  Results will also be communicated to Dr. Lopez and Dr. Wilson.    Approximately 50 minutes was spent in coordination of care for Mr. Corrigan.

## 2025-03-13 ENCOUNTER — MED REC SCAN ONLY (OUTPATIENT)
Dept: FAMILY MEDICINE CLINIC | Facility: CLINIC | Age: 40
End: 2025-03-13

## 2025-05-07 ENCOUNTER — TELEMEDICINE (OUTPATIENT)
Dept: FAMILY MEDICINE CLINIC | Facility: CLINIC | Age: 40
End: 2025-05-07
Payer: COMMERCIAL

## 2025-05-07 DIAGNOSIS — Z30.09 CONSULTATION FOR STERILIZATION: Primary | ICD-10-CM

## 2025-05-07 PROCEDURE — 98005 SYNCH AUDIO-VIDEO EST LOW 20: CPT | Performed by: FAMILY MEDICINE

## 2025-05-07 NOTE — PROGRESS NOTES
Video Visit    This visit is conducted using Telemedicine with live, interactive video and audio.    [facility]  verbally consents to a Video visit.    Patient understands and accepts financial responsibility for any deductible, co-insurance and/or co-pays associated with this service.    Duration of the service: 8:24 minutes      Summary of topics discussed:     Vasectomy: pt desires vasectomy.  He has 1 child and 2nd child is on her way, due in about 9 wks. He has d/w his wife and they are both in agreement.     ROS: as stated per HPI  Physical Exam: Exam is limited due to no face to face visit today. Pt is awake and alert, does not appear to be in acute distress.      Assessment/Plan:  1) Consult for sterilization  - referral to Urology for eval and procedure (Dr. Lopez)     The patient and provider have a longitudinal relationship to address/treat the serious or   complex condition(s) as stated in this encounter.       Total time spent for audio/video visit and note writin:24  min      No orders of the defined types were placed in this encounter.     No orders of the defined types were placed in this encounter.         Return for annual physical due in 2025.      Trudi Wilson MD